# Patient Record
Sex: FEMALE | Race: WHITE | NOT HISPANIC OR LATINO | Employment: PART TIME | URBAN - METROPOLITAN AREA
[De-identification: names, ages, dates, MRNs, and addresses within clinical notes are randomized per-mention and may not be internally consistent; named-entity substitution may affect disease eponyms.]

---

## 2017-12-04 ENCOUNTER — HOSPITAL ENCOUNTER (OUTPATIENT)
Dept: CARDIOLOGY | Facility: HOSPITAL | Age: 62
Discharge: HOME | End: 2017-12-04

## 2020-09-24 ENCOUNTER — TRANSCRIBE ORDERS (OUTPATIENT)
Dept: ADMINISTRATIVE | Facility: HOSPITAL | Age: 65
End: 2020-09-24

## 2020-09-24 DIAGNOSIS — R20.0 NUMBNESS: Primary | ICD-10-CM

## 2020-12-29 ENCOUNTER — OFFICE (AMBULATORY)
Dept: URBAN - METROPOLITAN AREA PATHOLOGY 4 | Facility: PATHOLOGY | Age: 65
End: 2020-12-29
Payer: COMMERCIAL

## 2020-12-29 ENCOUNTER — ON CAMPUS - OUTPATIENT (AMBULATORY)
Dept: URBAN - METROPOLITAN AREA HOSPITAL 2 | Facility: HOSPITAL | Age: 65
End: 2020-12-29
Payer: MEDICARE

## 2020-12-29 ENCOUNTER — OFFICE (AMBULATORY)
Dept: URBAN - METROPOLITAN AREA PATHOLOGY 4 | Facility: PATHOLOGY | Age: 65
End: 2020-12-29

## 2020-12-29 VITALS
HEART RATE: 78 BPM | DIASTOLIC BLOOD PRESSURE: 76 MMHG | RESPIRATION RATE: 16 BRPM | SYSTOLIC BLOOD PRESSURE: 106 MMHG | HEART RATE: 71 BPM | WEIGHT: 116 LBS | HEART RATE: 73 BPM | SYSTOLIC BLOOD PRESSURE: 105 MMHG | TEMPERATURE: 97.8 F | SYSTOLIC BLOOD PRESSURE: 131 MMHG | SYSTOLIC BLOOD PRESSURE: 112 MMHG | SYSTOLIC BLOOD PRESSURE: 102 MMHG | DIASTOLIC BLOOD PRESSURE: 68 MMHG | OXYGEN SATURATION: 99 % | SYSTOLIC BLOOD PRESSURE: 108 MMHG | DIASTOLIC BLOOD PRESSURE: 57 MMHG | HEIGHT: 60 IN | DIASTOLIC BLOOD PRESSURE: 59 MMHG | HEART RATE: 67 BPM | HEART RATE: 80 BPM | SYSTOLIC BLOOD PRESSURE: 104 MMHG | HEART RATE: 70 BPM | OXYGEN SATURATION: 100 % | SYSTOLIC BLOOD PRESSURE: 111 MMHG | DIASTOLIC BLOOD PRESSURE: 51 MMHG | SYSTOLIC BLOOD PRESSURE: 125 MMHG | SYSTOLIC BLOOD PRESSURE: 123 MMHG | DIASTOLIC BLOOD PRESSURE: 83 MMHG | SYSTOLIC BLOOD PRESSURE: 109 MMHG | DIASTOLIC BLOOD PRESSURE: 86 MMHG | HEART RATE: 74 BPM | DIASTOLIC BLOOD PRESSURE: 64 MMHG

## 2020-12-29 DIAGNOSIS — R13.10 DYSPHAGIA, UNSPECIFIED: ICD-10-CM

## 2020-12-29 DIAGNOSIS — K55.20 ANGIODYSPLASIA OF COLON WITHOUT HEMORRHAGE: ICD-10-CM

## 2020-12-29 DIAGNOSIS — K22.5 DIVERTICULUM OF ESOPHAGUS, ACQUIRED: ICD-10-CM

## 2020-12-29 DIAGNOSIS — D12.4 BENIGN NEOPLASM OF DESCENDING COLON: ICD-10-CM

## 2020-12-29 DIAGNOSIS — Z86.010 PERSONAL HISTORY OF COLONIC POLYPS: ICD-10-CM

## 2020-12-29 PROBLEM — K63.5 POLYP OF COLON: Status: ACTIVE | Noted: 2020-12-29

## 2020-12-29 LAB
GI HISTOLOGY: A. UNSPECIFIED: (no result)
GI HISTOLOGY: PDF REPORT: (no result)

## 2020-12-29 PROCEDURE — 88305 TISSUE EXAM BY PATHOLOGIST: CPT | Mod: 26 | Performed by: INTERNAL MEDICINE

## 2020-12-29 PROCEDURE — 43249 ESOPH EGD DILATION <30 MM: CPT | Mod: 59 | Performed by: INTERNAL MEDICINE

## 2020-12-29 PROCEDURE — 45385 COLONOSCOPY W/LESION REMOVAL: CPT | Mod: 33 | Performed by: INTERNAL MEDICINE

## 2021-03-18 ENCOUNTER — TREATMENT (OUTPATIENT)
Dept: PHYSICAL THERAPY | Facility: CLINIC | Age: 66
End: 2021-03-18

## 2021-03-18 DIAGNOSIS — G89.29 CHRONIC INTRACTABLE HEADACHE, UNSPECIFIED HEADACHE TYPE: Primary | ICD-10-CM

## 2021-03-18 DIAGNOSIS — R51.9 CHRONIC INTRACTABLE HEADACHE, UNSPECIFIED HEADACHE TYPE: Primary | ICD-10-CM

## 2021-03-18 PROCEDURE — 97140 MANUAL THERAPY 1/> REGIONS: CPT | Performed by: PHYSICAL THERAPIST

## 2021-03-18 PROCEDURE — 97162 PT EVAL MOD COMPLEX 30 MIN: CPT | Performed by: PHYSICAL THERAPIST

## 2021-03-18 PROCEDURE — 97110 THERAPEUTIC EXERCISES: CPT | Performed by: PHYSICAL THERAPIST

## 2021-03-19 ENCOUNTER — HOSPITAL ENCOUNTER (OUTPATIENT)
Dept: NEUROLOGY | Facility: HOSPITAL | Age: 66
Discharge: HOME | End: 2021-03-19
Admitting: NURSE PRACTITIONER

## 2021-03-19 DIAGNOSIS — R20.0 NUMBNESS: ICD-10-CM

## 2021-03-19 PROCEDURE — 95886 MUSC TEST DONE W/N TEST COMP: CPT

## 2021-03-19 PROCEDURE — 95886 MUSC TEST DONE W/N TEST COMP: CPT | Performed by: PSYCHIATRY & NEUROLOGY

## 2021-03-19 PROCEDURE — 95910 NRV CNDJ TEST 7-8 STUDIES: CPT

## 2021-03-19 PROCEDURE — 95910 NRV CNDJ TEST 7-8 STUDIES: CPT | Performed by: PSYCHIATRY & NEUROLOGY

## 2021-03-19 NOTE — PROGRESS NOTES
"  Physical Therapy Initial Evaluation and Plan of Care    Patient: Thuy Phillips   : 1955  Diagnosis/ICD-10 Code:  Chronic intractable headache, unspecified headache type [R51.9, G89.29]  Referring practitioner: MARK Hughes  Date of Initial Visit: 3/18/2021  Today's Date: 3/18/2021  Patient seen for 1 sessions             Subjective Evaluation    History of Present Illness  Mechanism of injury: Patient is a 65 y.o. WF who presents with intractable chronic headaches and migraines, gradually worsening in scope over the past 2 years and especially the last 6 months with \"numbness\" of her whole face and more recently her whole body.  She describes it as pain but not in the muscles or joints.  She is a dental hygienist x 34 years, working 2 days per week.  Personal goals are to find out what is triggering her headaches, be able to work without headache and garden.  She has 3 neurologists and is having an EMG/NCV 3/19.  MRI shows small vessel disease throughout her body.      Patient Goals  Patient goals for therapy: decreased pain, increased motion and return to sport/leisure activities             Objective Headache Disability Index indicates 70% impairment.  Cervical SBR and rotation L limited with L upper trap tension/discomfort.  No significant tenderness with PA glide or rotation Cervical vertebrae.  Cervical distraction decreases symptoms.  Posture assessment standing:  Rounded shoulders, limited tricep flexibility, with arms drifting laterally away from head with elbows bent (unable to keep locked straight).  Muscle guarding L UT, SCM, subocc.  MMT:  4/5 B tricep, otherwise WNL.      Assessment & Plan     Assessment  Impairments: abnormal muscle tone, abnormal or restricted ROM, activity intolerance, lacks appropriate home exercise program and pain with function  Functional Limitations: sleeping  Goals  Plan Goals: Patient independent with HEP in 2 weeks  Tolerate 10 min Nustep in 2 " weeks  Decrease frequency/intensity of headache and symptoms 25% in 2 weeks  Complete Pain Neuroscience Education modules in 2 weeks  Improve function as evidenced by Headache Disability Index score of 20% or less by discharge (70% impairment initially)  Able to manage symptoms by discharge  Perform 10 reps of repeated sit to stand without arms in 30 seconds by discharge       Plan  Therapy options: will be seen for skilled physical therapy services  Planned modality interventions: dry needling, cryotherapy, thermotherapy (hydrocollator packs) and traction  Other planned modality interventions: physical modalities as needed  Planned therapy interventions: manual therapy, flexibility, functional ROM exercises, home exercise program, joint mobilization, neuromuscular re-education, postural training, spinal/joint mobilization, strengthening, stretching and therapeutic activities  Treatment plan discussed with: patient  Plan details: Plan to continue PT 1-2x's per week up to 12 visits if needed.        Timed:         Manual Therapy:    15     mins  71923;     Therapeutic Exercise:    15     mins  70738;     Neuromuscular Elle:        mins  23012;    Therapeutic Activity:          mins  30812;     Gait Training:           mins  85823;     Ultrasound:          mins  18064;    Ionto                                   mins   17946  Self-care  ____ mins 57694    Un-Timed:  Electrical Stimulation:         mins  34685 ( );  Dry Needling          mins self-pay  Traction          mins 79987  Low Eval          Mins  93924  Mod Eval     30     Mins  02770  High Eval                            Mins  86108  Canal repositioning _____ mins  88948        Timed Treatment:   30   mins   Total Treatment:     60   mins    PT SIGNATURE: Robin A Sprigler, PT   DATE TREATMENT INITIATED: 3/19/2021    Initial Certification  Certification Period: 6/17/2021  I certify that the therapy services are furnished while this patient is under my  care.  The services outlined above are required by this patient, and will be reviewed every 90 days.     PHYSICIAN: Aundrea Aedn APRN _________________________________________________________________________________________________     DATE: _________________________________________________________________________________________________________________________________    Please sign and return via fax to [phone]. Thank you, [facility].

## 2021-03-22 ENCOUNTER — TREATMENT (OUTPATIENT)
Dept: PHYSICAL THERAPY | Facility: CLINIC | Age: 66
End: 2021-03-22

## 2021-03-22 DIAGNOSIS — R51.9 CHRONIC INTRACTABLE HEADACHE, UNSPECIFIED HEADACHE TYPE: Primary | ICD-10-CM

## 2021-03-22 DIAGNOSIS — G89.29 CHRONIC INTRACTABLE HEADACHE, UNSPECIFIED HEADACHE TYPE: Primary | ICD-10-CM

## 2021-03-22 PROCEDURE — 97110 THERAPEUTIC EXERCISES: CPT | Performed by: PHYSICAL THERAPIST

## 2021-03-22 PROCEDURE — 97012 MECHANICAL TRACTION THERAPY: CPT | Performed by: PHYSICAL THERAPIST

## 2021-03-22 PROCEDURE — 97140 MANUAL THERAPY 1/> REGIONS: CPT | Performed by: PHYSICAL THERAPIST

## 2021-03-22 NOTE — PROGRESS NOTES
Physical Therapy Daily Progress Note    Patient: Thuy Phillips   : 1955  Diagnosis/ICD-10 Code:  Chronic intractable headache, unspecified headache type [R51.9, G89.29]  Referring practitioner: No ref. provider found  Date of Initial Visit: Type: THERAPY  Noted: 3/18/2021  Today's Date: 3/22/2021  Patient seen for 2 sessions             Subjective Patient reports no headache today.  She did have a bad headache Saturday.  Her EMG/NCV test was normal/unremarkable.     Objective   See Exercise, Manual, and Modality Logs for complete treatment. Performed trial of TDN today for suboccipitals, CPSM, SCM, and UT B.  Added static cervical traction with intermittent reproduction of symptoms during treatment.  Plan to try softer traction cradle next visit.  Able to tolerate 10 min on Nustep today.  Added chin tuck plus lift and prone cervical extension with alternating rotation to HEP as well as jaw opening.  Patient very responsive to all interventions.      Assessment/Plan  Patient independent with HEP in 2 weeks - NOT MET  Tolerate 10 min Nustep in 2 weeks - MET  Decrease frequency/intensity of headache and symptoms 25% in 2 weeks - NOT MET  Complete 3 Pain Neuroscience Education modules in 2 weeks - NOT MET  Improve function as evidenced by Headache Disability Index score of 20% or less by discharge (70% impairment initially) - NOT MET  Able to manage symptoms by discharge - NOT MET  Perform 10 reps of repeated sit to stand without arms in 30 seconds by discharge - NOT MET    Progress per Plan of Care up to 12 visits if needed.  Plan to try softer traction cradle next visit.  Plan to try KT to scapulae next visit.  Plan to start PNE modules next visit.  Assess response to treatment thus far.           Timed:         Manual Therapy:    15     mins  10661;     Therapeutic Exercise:    15     mins  84196;     Neuromuscular Elle:        mins  47110;    Therapeutic Activity:          mins  01910;     Gait  Training:           mins  72985;     Ultrasound:          mins  88248;    Ionto                                   mins   91673  Self Care                            mins   25580      Un-Timed:  Electrical Stimulation:         mins  52476 ( );  Dry Needling    15      mins self-pay - NO CHARGE TODAY, FREE TRIAL  Traction     15     mins 00375  Low Eval          Mins  19353  Mod Eval          Mins  04526  High Eval                            Mins  41722  Canalith Repos                   mins  91655    Timed Treatment:   30   mins   Total Treatment:     60   mins    Robin A Sprigler, PT  Physical Therapist

## 2021-03-25 ENCOUNTER — TREATMENT (OUTPATIENT)
Dept: PHYSICAL THERAPY | Facility: CLINIC | Age: 66
End: 2021-03-25

## 2021-03-25 DIAGNOSIS — G89.29 CHRONIC INTRACTABLE HEADACHE, UNSPECIFIED HEADACHE TYPE: Primary | ICD-10-CM

## 2021-03-25 DIAGNOSIS — R51.9 CHRONIC INTRACTABLE HEADACHE, UNSPECIFIED HEADACHE TYPE: Primary | ICD-10-CM

## 2021-03-25 PROCEDURE — 97530 THERAPEUTIC ACTIVITIES: CPT | Performed by: PHYSICAL THERAPIST

## 2021-03-25 PROCEDURE — 97110 THERAPEUTIC EXERCISES: CPT | Performed by: PHYSICAL THERAPIST

## 2021-03-25 PROCEDURE — 97012 MECHANICAL TRACTION THERAPY: CPT | Performed by: PHYSICAL THERAPIST

## 2021-03-25 PROCEDURE — DRYNDL PR CUSTOM DRY NEEDLING SELF PAY: Performed by: PHYSICAL THERAPIST

## 2021-03-25 NOTE — PROGRESS NOTES
Physical Therapy Daily Progress Note    Patient: Thuy Phillips   : 1955  Diagnosis/ICD-10 Code:  Chronic intractable headache, unspecified headache type [R51.9, G89.29]  Referring practitioner: MARK Hughes  Date of Initial Visit: Type: THERAPY  Noted: 3/18/2021  Today's Date: 3/25/2021  Patient seen for 3 sessions             Subjective Patient reports mild headache today.  Asking questions written down regarding symptoms, seeking answers, wanting to be heard.  Mentions cold along median N distribution and feet, always cold.  Looking for hope.  Requests to repeat traction and TDN today.    Objective   See Exercise, Manual, and Modality Logs for complete treatment. Initiated Pain Neuroscience Education modules today, reviewing modules for Nerve Sensors and Calming Nerves while performing Nustep and assigned homework.  Addressed questions from last visit.  Suggested a consult with a vascular specialist.  Applied kinesiotape to B scapulae to cue shoulder retraction/depression.  Patient said she liked how the tape felt and asked if we would do it every time.        Assessment/Plan  Patient independent with HEP in 2 weeks - NOT MET  Tolerate 10 min Nustep in 2 weeks - MET  Decrease frequency/intensity of headache and symptoms 25% in 2 weeks - NOT MET  Complete 3 Pain Neuroscience Education modules in 2 weeks - NOT MET  Improve function as evidenced by Headache Disability Index score of 20% or less by discharge (70% impairment initially) - NOT MET  Able to manage symptoms by discharge - NOT MET  Perform 10 reps of repeated sit to stand without arms in 30 seconds by discharge - NOT MET    Progress per Plan of Care up to 12 visits if needed.  Assess response to treatment.  Plan to add repeated sit to stand next visit to HEP for increased blood flow at least once per day.             Timed:         Manual Therapy:    5     mins  12021;     Therapeutic Exercise:    15     mins  72135;      Neuromuscular Elle:        mins  50580;    Therapeutic Activity:    15      mins  97904;     Gait Training:           mins  61109;     Ultrasound:          mins  23934;    Ionto                                   mins   93258  Self Care                            mins   07156      Un-Timed:  Electrical Stimulation:         mins  21064 ( );  Dry Needling     15     mins self-pay  Traction     15     mins 50531  Low Eval          Mins  44777  Mod Eval          Mins  34941  High Eval                            Mins  90610  Canalith Repos                   mins  51894    Timed Treatment:   30   mins   Total Treatment:     60   mins    Robin A Sprigler, PT  Physical Therapist

## 2021-03-30 ENCOUNTER — TREATMENT (OUTPATIENT)
Dept: PHYSICAL THERAPY | Facility: CLINIC | Age: 66
End: 2021-03-30

## 2021-03-30 DIAGNOSIS — R51.9 CHRONIC INTRACTABLE HEADACHE, UNSPECIFIED HEADACHE TYPE: Primary | ICD-10-CM

## 2021-03-30 DIAGNOSIS — G89.29 CHRONIC INTRACTABLE HEADACHE, UNSPECIFIED HEADACHE TYPE: Primary | ICD-10-CM

## 2021-03-30 PROCEDURE — 97110 THERAPEUTIC EXERCISES: CPT | Performed by: PHYSICAL THERAPIST

## 2021-03-30 PROCEDURE — 97530 THERAPEUTIC ACTIVITIES: CPT | Performed by: PHYSICAL THERAPIST

## 2021-03-30 NOTE — PROGRESS NOTES
Physical Therapy Daily Progress Note    VISIT#: 4    Subjective   Thuy Phillips reports that she has had a headache for a couple of days and it has not let up. She is feeling nauseous from the headache today.   Pain Ratin    Objective     See Exercise, Manual, and Modality Logs for complete treatment.     Patient Education: pain science, dry needling benefits, pathology    Assessment/Plan   Pt presented with increased pain and nausea due to a headache. Exercises were modified due to increased symptoms.    Progress per Plan of Care and Progress strengthening /stabilization /functional activity          Timed:         Manual Therapy:         mins  11965;     Therapeutic Exercise:    10     mins  61067;     Neuromuscular Elle:        mins  89308;    Therapeutic Activity:     15     mins  63772;     Gait Training:           mins  81408;     Ultrasound:          mins  26878;    Ionto                                   mins   80842  Self Care                            mins   61604  Canalith Repos                   mins  54010    Un-Timed:  Electrical Stimulation:         mins  29090 ( );  Dry Needling          mins self-pay  Traction          mins 05627  Low Eval          Mins  11650  Mod Eval          Mins  08015  High Eval                            Mins  61167  Re-Eval                               mins  81837    Timed Treatment:   25   mins   Total Treatment:     25   mins    Melissa Moreno  Physical Therapist Assistant  [place] License # [id number]

## 2021-04-02 ENCOUNTER — APPOINTMENT (OUTPATIENT)
Dept: NEUROLOGY | Facility: HOSPITAL | Age: 66
End: 2021-04-02

## 2021-04-08 ENCOUNTER — TREATMENT (OUTPATIENT)
Dept: PHYSICAL THERAPY | Facility: CLINIC | Age: 66
End: 2021-04-08

## 2021-04-08 DIAGNOSIS — G89.29 CHRONIC INTRACTABLE HEADACHE, UNSPECIFIED HEADACHE TYPE: Primary | ICD-10-CM

## 2021-04-08 DIAGNOSIS — R51.9 CHRONIC INTRACTABLE HEADACHE, UNSPECIFIED HEADACHE TYPE: Primary | ICD-10-CM

## 2021-04-08 PROCEDURE — 97012 MECHANICAL TRACTION THERAPY: CPT | Performed by: PHYSICAL THERAPIST

## 2021-04-08 PROCEDURE — 97530 THERAPEUTIC ACTIVITIES: CPT | Performed by: PHYSICAL THERAPIST

## 2021-04-08 PROCEDURE — 97140 MANUAL THERAPY 1/> REGIONS: CPT | Performed by: PHYSICAL THERAPIST

## 2021-04-08 NOTE — PROGRESS NOTES
Physical Therapy Daily Progress Note    Patient: Thuy Phillips   : 1955  Diagnosis/ICD-10 Code:  Chronic intractable headache, unspecified headache type [R51.9, G89.29]  Referring practitioner: MARK Hughes  Date of Initial Visit: Type: THERAPY  Noted: 3/18/2021  Today's Date: 2021  Patient seen for 5 sessions             Subjective Patient has a consult scheduled with Dr. Singh on  for possible exploration of brainstem.  Headache continues to be bad and nearly constant.  Patient frustrated and weepy today but hopeful for a solution and relief in the future.    Objective   See Exercise, Manual, and Modality Logs for complete treatment. Completed Neurogenic inflammation module of PNE.  Added repeated sit to stand for cardio to increase blood flow.  Discontinued TDN and kinesiotape.  Continued traction with ice today.  Patient uncomfortable with position for traction but wanted to continue with it.      Assessment/Plan  Patient independent with HEP in 2 weeks - NOT MET  Tolerate 10 min Nustep in 2 weeks - MET  Decrease frequency/intensity of headache and symptoms 25% in 2 weeks - NOT MET  Complete 3 Pain Neuroscience Education modules in 2 weeks - NOT MET  Improve function as evidenced by Headache Disability Index score of 20% or less by discharge (70% impairment initially) - NOT MET  Able to manage symptoms by discharge - NOT MET  Perform 10 reps of repeated sit to stand without arms in 30 seconds by discharge - NOT MET    Progress per Plan of Care up to 12 visits if needed           Timed:         Manual Therapy:    10     mins  86611;     Therapeutic Exercise:         mins  36260;     Neuromuscular Elle:        mins  31534;    Therapeutic Activity:     15     mins  61958;     Gait Training:           mins  82064;     Ultrasound:          mins  19440;    Ionto                                   mins   96803  Self Care                            mins    89814      Un-Timed:  Electrical Stimulation:         mins  18347 ( );  Dry Needling          mins self-pay  Traction     15     mins 39003  Low Eval          Mins  57837  Mod Eval          Mins  97881  High Eval                            Mins  47161  Canalith Repos                   mins  71924    Timed Treatment:   25   mins   Total Treatment:     40   mins    Robin A Sprigler, PT  Physical Therapist

## 2021-04-13 ENCOUNTER — TREATMENT (OUTPATIENT)
Dept: PHYSICAL THERAPY | Facility: CLINIC | Age: 66
End: 2021-04-13

## 2021-04-13 DIAGNOSIS — G89.29 CHRONIC INTRACTABLE HEADACHE, UNSPECIFIED HEADACHE TYPE: Primary | ICD-10-CM

## 2021-04-13 DIAGNOSIS — R51.9 CHRONIC INTRACTABLE HEADACHE, UNSPECIFIED HEADACHE TYPE: Primary | ICD-10-CM

## 2021-04-13 PROCEDURE — 97140 MANUAL THERAPY 1/> REGIONS: CPT | Performed by: PHYSICAL THERAPIST

## 2021-04-13 PROCEDURE — 97530 THERAPEUTIC ACTIVITIES: CPT | Performed by: PHYSICAL THERAPIST

## 2021-04-13 PROCEDURE — 97012 MECHANICAL TRACTION THERAPY: CPT | Performed by: PHYSICAL THERAPIST

## 2021-04-13 NOTE — PROGRESS NOTES
Discharge Summary  Discharge Summary from Physical Therapy Report        Goals: Partially Met    Discharge Plan: Continue with current home exercise program as instructed    Comments  See below, patient voices readiness for discharge    Date of Discharge 21        Robin A Sprigler, PT  Physical Therapist           Physical Therapy Daily Progress Note    Patient: Thuy Phillips   : 1955  Diagnosis/ICD-10 Code:  Chronic intractable headache, unspecified headache type [R51.9, G89.29]  Referring practitioner: MARK Hughes  Date of Initial Visit: Type: THERAPY  Noted: 3/18/2021  Today's Date: 2021  Patient seen for 6 sessions             Subjective Patient has been pleased with treatment but has not had any improvement in symptoms.  She voices readiness for discharge and will follow up with Dr. Singh to explore brainstem involvement.    Objective   See Exercise, Manual, and Modality Logs for complete treatment. No significant change in symptoms.      Assessment/Plan  Patient independent with HEP in 2 weeks - MET  Tolerate 10 min Nustep in 2 weeks - MET  Decrease frequency/intensity of headache and symptoms 25% in 2 weeks - NOT MET  Complete 3 Pain Neuroscience Education modules in 2 weeks - MET  Improve function as evidenced by Headache Disability Index score of 20% or less by discharge (70% impairment initially) - NOT MET  Able to manage symptoms by discharge - NOT MET  Perform 10 reps of repeated sit to stand without arms in 30 seconds by discharge - NOT MET    Plan to [facility] to [facility] and self-management.  She plans to follow up with Dr. Singh with further evaluation of brainstem involvement.           Timed:         Manual Therapy:    10     mins  39438;     Therapeutic Exercise:         mins  13935;     Neuromuscular Elle:        mins  83200;    Therapeutic Activity:     15     mins  24596;     Gait Training:           mins  32700;     Ultrasound:          mins  49247;    Ionto                                    mins   51908  Self Care                            mins   49477      Un-Timed:  Electrical Stimulation:         mins  94748 ( );  Dry Needling          mins self-pay  Traction     15     mins 04678  Low Eval          Mins  59950  Mod Eval          Mins  88331  High Eval                            Mins  50292  Canalith Repos                   mins  75625    Timed Treatment:   25   mins   Total Treatment:     40   mins    Robin A Sprigler, PT  Physical Therapist

## 2023-05-25 ENCOUNTER — OFFICE (AMBULATORY)
Dept: URBAN - METROPOLITAN AREA CLINIC 64 | Facility: CLINIC | Age: 68
End: 2023-05-25

## 2023-05-25 VITALS
HEART RATE: 73 BPM | HEIGHT: 60 IN | SYSTOLIC BLOOD PRESSURE: 100 MMHG | WEIGHT: 115 LBS | DIASTOLIC BLOOD PRESSURE: 63 MMHG

## 2023-05-25 DIAGNOSIS — Q39.6 CONGENITAL DIVERTICULUM OF ESOPHAGUS: ICD-10-CM

## 2023-05-25 DIAGNOSIS — R13.10 DYSPHAGIA, UNSPECIFIED: ICD-10-CM

## 2023-05-25 DIAGNOSIS — R07.9 CHEST PAIN, UNSPECIFIED: ICD-10-CM

## 2023-05-25 PROCEDURE — 99213 OFFICE O/P EST LOW 20 MIN: CPT | Performed by: NURSE PRACTITIONER

## 2023-07-06 PROBLEM — R13.10 DYSPHAGIA: Status: ACTIVE | Noted: 2020-12-29

## 2023-08-03 ENCOUNTER — ON CAMPUS - OUTPATIENT (AMBULATORY)
Dept: URBAN - METROPOLITAN AREA HOSPITAL 2 | Facility: HOSPITAL | Age: 68
End: 2023-08-03

## 2023-08-03 VITALS
OXYGEN SATURATION: 98 % | HEART RATE: 73 BPM | DIASTOLIC BLOOD PRESSURE: 78 MMHG | DIASTOLIC BLOOD PRESSURE: 70 MMHG | RESPIRATION RATE: 16 BRPM | HEIGHT: 60 IN | HEART RATE: 96 BPM | DIASTOLIC BLOOD PRESSURE: 74 MMHG | SYSTOLIC BLOOD PRESSURE: 101 MMHG | SYSTOLIC BLOOD PRESSURE: 124 MMHG | DIASTOLIC BLOOD PRESSURE: 64 MMHG | SYSTOLIC BLOOD PRESSURE: 138 MMHG | SYSTOLIC BLOOD PRESSURE: 106 MMHG | SYSTOLIC BLOOD PRESSURE: 112 MMHG | TEMPERATURE: 97.7 F | RESPIRATION RATE: 18 BRPM | SYSTOLIC BLOOD PRESSURE: 126 MMHG | HEART RATE: 102 BPM | DIASTOLIC BLOOD PRESSURE: 50 MMHG | HEART RATE: 83 BPM | HEART RATE: 106 BPM | OXYGEN SATURATION: 99 % | WEIGHT: 109 LBS | HEART RATE: 104 BPM | DIASTOLIC BLOOD PRESSURE: 65 MMHG | SYSTOLIC BLOOD PRESSURE: 121 MMHG | OXYGEN SATURATION: 100 % | HEART RATE: 107 BPM | DIASTOLIC BLOOD PRESSURE: 73 MMHG

## 2023-08-03 DIAGNOSIS — R13.10 DYSPHAGIA, UNSPECIFIED: ICD-10-CM

## 2023-08-03 DIAGNOSIS — K22.5 DIVERTICULUM OF ESOPHAGUS, ACQUIRED: ICD-10-CM

## 2023-08-03 DIAGNOSIS — K44.9 DIAPHRAGMATIC HERNIA WITHOUT OBSTRUCTION OR GANGRENE: ICD-10-CM

## 2023-08-03 DIAGNOSIS — R07.9 CHEST PAIN, UNSPECIFIED: ICD-10-CM

## 2023-08-03 DIAGNOSIS — K22.2 ESOPHAGEAL OBSTRUCTION: ICD-10-CM

## 2023-08-03 PROCEDURE — 43249 ESOPH EGD DILATION <30 MM: CPT | Performed by: INTERNAL MEDICINE

## 2023-08-03 NOTE — SERVICEHPINOTES
TOMMY NICOLE  is a  67  female   who presents today for a  EGD   for   the indications listed below. The updated Patient Profile was reviewed prior to the procedure, in conjunction with the Physical Exam, including medical conditions, surgical procedures, medications, allergies, family history and social history. See Physical Exam time stamp below for date and time of HPI completion.Pre-operatively, I reviewed the indication(s) for the procedure, the risks of the procedure [including but not limited to: unexpected bleeding possibly requiring hospitalization and/or unplanned repeat procedures, perforation possibly requiring surgical treatment, missed lesions and complications of sedation/MAC (also explained by anesthesia staff)]. I have evaluated the patient for risks associated with the planned anesthesia and the procedure to be performed and find the patient an acceptable candidate for IV sedation.Multiple opportunities were provided for any questions or concerns, and all questions were answered satisfactorily before any anesthesia was administered. We will proceed with the planned procedure.br

## 2025-03-07 ENCOUNTER — OFFICE (AMBULATORY)
Dept: URBAN - METROPOLITAN AREA CLINIC 64 | Facility: CLINIC | Age: 70
End: 2025-03-07
Payer: MEDICARE

## 2025-03-07 VITALS
DIASTOLIC BLOOD PRESSURE: 80 MMHG | HEART RATE: 73 BPM | HEIGHT: 60 IN | SYSTOLIC BLOOD PRESSURE: 132 MMHG | WEIGHT: 113 LBS

## 2025-03-07 DIAGNOSIS — R13.19 OTHER DYSPHAGIA: ICD-10-CM

## 2025-03-07 DIAGNOSIS — R13.10 DYSPHAGIA, UNSPECIFIED: ICD-10-CM

## 2025-03-07 DIAGNOSIS — D50.9 IRON DEFICIENCY ANEMIA, UNSPECIFIED: ICD-10-CM

## 2025-03-07 DIAGNOSIS — R10.13 EPIGASTRIC PAIN: ICD-10-CM

## 2025-03-07 PROCEDURE — 99214 OFFICE O/P EST MOD 30 MIN: CPT | Performed by: NURSE PRACTITIONER

## 2025-03-18 ENCOUNTER — OFFICE (AMBULATORY)
Dept: URBAN - METROPOLITAN AREA PATHOLOGY 19 | Facility: PATHOLOGY | Age: 70
End: 2025-03-18
Payer: COMMERCIAL

## 2025-03-18 ENCOUNTER — OFFICE (AMBULATORY)
Dept: URBAN - METROPOLITAN AREA PATHOLOGY 19 | Facility: PATHOLOGY | Age: 70
End: 2025-03-18
Payer: MEDICARE

## 2025-03-18 ENCOUNTER — ON CAMPUS - OUTPATIENT (AMBULATORY)
Dept: URBAN - METROPOLITAN AREA HOSPITAL 2 | Facility: HOSPITAL | Age: 70
End: 2025-03-18
Payer: MEDICARE

## 2025-03-18 VITALS
RESPIRATION RATE: 15 BRPM | DIASTOLIC BLOOD PRESSURE: 69 MMHG | DIASTOLIC BLOOD PRESSURE: 64 MMHG | HEIGHT: 60 IN | HEART RATE: 74 BPM | SYSTOLIC BLOOD PRESSURE: 104 MMHG | DIASTOLIC BLOOD PRESSURE: 54 MMHG | OXYGEN SATURATION: 100 % | HEART RATE: 66 BPM | SYSTOLIC BLOOD PRESSURE: 113 MMHG | OXYGEN SATURATION: 99 % | SYSTOLIC BLOOD PRESSURE: 134 MMHG | SYSTOLIC BLOOD PRESSURE: 105 MMHG | SYSTOLIC BLOOD PRESSURE: 120 MMHG | SYSTOLIC BLOOD PRESSURE: 116 MMHG | RESPIRATION RATE: 16 BRPM | DIASTOLIC BLOOD PRESSURE: 66 MMHG | DIASTOLIC BLOOD PRESSURE: 60 MMHG | HEART RATE: 70 BPM | DIASTOLIC BLOOD PRESSURE: 70 MMHG | HEART RATE: 67 BPM | TEMPERATURE: 97.7 F | HEART RATE: 83 BPM | SYSTOLIC BLOOD PRESSURE: 100 MMHG | SYSTOLIC BLOOD PRESSURE: 95 MMHG | DIASTOLIC BLOOD PRESSURE: 68 MMHG | SYSTOLIC BLOOD PRESSURE: 97 MMHG | RESPIRATION RATE: 19 BRPM | WEIGHT: 114 LBS | OXYGEN SATURATION: 98 % | HEART RATE: 71 BPM

## 2025-03-18 DIAGNOSIS — D12.2 BENIGN NEOPLASM OF ASCENDING COLON: ICD-10-CM

## 2025-03-18 DIAGNOSIS — R13.10 DYSPHAGIA, UNSPECIFIED: ICD-10-CM

## 2025-03-18 DIAGNOSIS — B37.81 CANDIDAL ESOPHAGITIS: ICD-10-CM

## 2025-03-18 DIAGNOSIS — K57.30 DIVERTICULOSIS OF LARGE INTESTINE WITHOUT PERFORATION OR ABS: ICD-10-CM

## 2025-03-18 DIAGNOSIS — D50.9 IRON DEFICIENCY ANEMIA, UNSPECIFIED: ICD-10-CM

## 2025-03-18 DIAGNOSIS — K55.20 ANGIODYSPLASIA OF COLON WITHOUT HEMORRHAGE: ICD-10-CM

## 2025-03-18 DIAGNOSIS — K22.5 DIVERTICULUM OF ESOPHAGUS, ACQUIRED: ICD-10-CM

## 2025-03-18 PROCEDURE — 45388 COLONOSCOPY W/ABLATION: CPT | Performed by: INTERNAL MEDICINE

## 2025-03-18 PROCEDURE — 43239 EGD BIOPSY SINGLE/MULTIPLE: CPT | Mod: 59 | Performed by: INTERNAL MEDICINE

## 2025-03-18 PROCEDURE — 45385 COLONOSCOPY W/LESION REMOVAL: CPT | Mod: 59 | Performed by: INTERNAL MEDICINE

## 2025-03-18 PROCEDURE — 43249 ESOPH EGD DILATION <30 MM: CPT | Performed by: INTERNAL MEDICINE

## 2025-03-18 PROCEDURE — 88305 TISSUE EXAM BY PATHOLOGIST: CPT | Mod: 26 | Performed by: PATHOLOGY

## 2025-03-18 RX ORDER — NYSTATIN 100000 [USP'U]/ML
2000 SUSPENSION ORAL
Qty: 280 | Refills: 1 | Status: ACTIVE
Start: 2025-03-18